# Patient Record
Sex: FEMALE | Race: WHITE | NOT HISPANIC OR LATINO | Employment: OTHER | URBAN - METROPOLITAN AREA
[De-identification: names, ages, dates, MRNs, and addresses within clinical notes are randomized per-mention and may not be internally consistent; named-entity substitution may affect disease eponyms.]

---

## 2021-12-22 DIAGNOSIS — R06.02 SHORTNESS OF BREATH: Primary | ICD-10-CM

## 2021-12-22 LAB — EKG IMPRESSION: NORMAL

## 2021-12-22 PROCEDURE — 99999 EKG: CPT | Performed by: INTERNAL MEDICINE

## 2022-04-20 ASSESSMENT — ENCOUNTER SYMPTOMS
WHEEZING: 1
RESPIRATORY SYMPTOMS COMMENTS: NOT ANYMORE
DYSPNEA AT REST: 0
SHORTNESS OF BREATH: 1

## 2022-04-21 ENCOUNTER — OFFICE VISIT (OUTPATIENT)
Dept: SLEEP MEDICINE | Facility: MEDICAL CENTER | Age: 47
End: 2022-04-21
Payer: MEDICAID

## 2022-04-21 VITALS
WEIGHT: 198 LBS | SYSTOLIC BLOOD PRESSURE: 124 MMHG | OXYGEN SATURATION: 92 % | HEART RATE: 119 BPM | BODY MASS INDEX: 37.41 KG/M2 | DIASTOLIC BLOOD PRESSURE: 66 MMHG

## 2022-04-21 DIAGNOSIS — R06.09 DYSPNEA ON EXERTION: ICD-10-CM

## 2022-04-21 DIAGNOSIS — R09.02 EXERCISE HYPOXEMIA: ICD-10-CM

## 2022-04-21 DIAGNOSIS — J45.40 MODERATE PERSISTENT ASTHMA WITHOUT COMPLICATION: ICD-10-CM

## 2022-04-21 PROCEDURE — 99204 OFFICE O/P NEW MOD 45 MIN: CPT | Mod: 25 | Performed by: STUDENT IN AN ORGANIZED HEALTH CARE EDUCATION/TRAINING PROGRAM

## 2022-04-21 PROCEDURE — 94761 N-INVAS EAR/PLS OXIMETRY MLT: CPT | Performed by: STUDENT IN AN ORGANIZED HEALTH CARE EDUCATION/TRAINING PROGRAM

## 2022-04-21 RX ORDER — OXCARBAZEPINE 150 MG/1
TABLET, FILM COATED ORAL DAILY
COMMUNITY

## 2022-04-21 RX ORDER — OXCARBAZEPINE 150 MG/1
300 TABLET, FILM COATED ORAL DAILY
COMMUNITY
Start: 2022-04-06

## 2022-04-21 RX ORDER — BUDESONIDE AND FORMOTEROL FUMARATE DIHYDRATE 160; 4.5 UG/1; UG/1
AEROSOL RESPIRATORY (INHALATION)
COMMUNITY
Start: 2021-12-01 | End: 2022-08-17

## 2022-04-21 RX ORDER — CLONAZEPAM 0.5 MG/1
0.5 TABLET ORAL PRN
COMMUNITY

## 2022-04-21 RX ORDER — ERGOCALCIFEROL 1.25 MG/1
50000 CAPSULE ORAL
COMMUNITY

## 2022-04-21 NOTE — PROCEDURES
Multi-Ox Readings  Multi Ox #1 Room air   O2 sat % at rest 93   O2 sat % on exertion 86   O2 sat average on exertion     Multi Ox #2 2 LPM   O2 sat % at rest 93   O2 sat % on exertion 92   O2 sat average on exertion       Oxygen Use     Oxygen Frequency     Duration of need     Is the patient mobile within the home?     CPAP Use?     BIPAP Use?     Servo Titration

## 2022-04-21 NOTE — PROGRESS NOTES
Pulmonary Clinic Note    Chief Complaint:  Chief Complaint   Patient presents with   • Establish Care     Referred by Dr Larson for Asthma     HPI:   Kristy Peters is a very pleasant 46 y.o. female with history of tobacco smoking 20+pkyr, quit 2021 who presents to pulmonary clinic for dyspnea.     Patient reports history of asthma and was initially referred to pulmonary for asthma management, however she now presents with ongoing dyspnea on exertion since her hospitalization for pneumonia in 2021. She was hospitalized at [facility] w/[facility] and was discharged home on O2 - since discharge, she is slowly improving her activity, now off O2. She can walk pretty far on level ground since her and her  go for routine walks around their property, however she becomes dyspneic when she exerts herself with activities like vacuuming and other resistance work. Denies any significant exposures, used to be around chickens that were owned by family members.       Past Medical History:   Diagnosis Date   • Asthma    • Back pain    • Chickenpox    • Daytime sleepiness    • Depression    • Diabetes (HCC)    • GERD (gastroesophageal reflux disease)    • Heartburn    • Insomnia    • Irregular periods    • Morning headache    • Nasal drainage    • Obesity    • Psychiatric disorder     bipolar   • Shortness of breath    • Wears glasses    • Wheezing        Past Surgical History:   Procedure Laterality Date   • LAPAROTOMY         Social History     Socioeconomic History   • Marital status: Single     Spouse name: Not on file   • Number of children: Not on file   • Years of education: Not on file   • Highest education level: Associate degree: occupational, technical, or vocational program   Occupational History   • Not on file   Tobacco Use   • Smoking status: Former Smoker     Packs/day: 1.00     Years: 20.00     Pack years: 20.00     Types: Cigarettes     Quit date: 2021     Years since quittin.3   • Smokeless  tobacco: Never Used   Vaping Use   • Vaping Use: Never used   Substance and Sexual Activity   • Alcohol use: No     Alcohol/week: 0.0 oz     Comment: Notes prior rare alcohol use, notes previously stopped use, denies current use   • Drug use: No     Comment: Notes prior occasional marijuana use, previously stopped use, denies current use   • Sexual activity: Not on file   Other Topics Concern   • Not on file   Social History Narrative   • Not on file     Social Determinants of Health     Financial Resource Strain: Medium Risk   • Difficulty of Paying Living Expenses: Somewhat hard   Food Insecurity: Food Insecurity Present   • Worried About Running Out of Food in the Last Year: Sometimes true   • Ran Out of Food in the Last Year: Never true   Transportation Needs: Unmet Transportation Needs   • Lack of Transportation (Medical): Yes   • Lack of Transportation (Non-Medical): Yes   Physical Activity: Insufficiently Active   • Days of Exercise per Week: 1 day   • Minutes of Exercise per Session: 20 min   Stress: No Stress Concern Present   • Feeling of Stress : Only a little   Social Connections: Socially Isolated   • Frequency of Communication with Friends and Family: Once a week   • Frequency of Social Gatherings with Friends and Family: Once a week   • Attends Congregation Services: Never   • Active Member of Clubs or Organizations: No   • Attends Club or Organization Meetings: Never   • Marital Status: Living with partner   Intimate Partner Violence: Not on file   Housing Stability: Low Risk    • Unable to Pay for Housing in the Last Year: No   • Number of Places Lived in the Last Year: 1   • Unstable Housing in the Last Year: No          Family History   Problem Relation Age of Onset   • Alzheimer's Disease Maternal Grandmother    • Asthma Father    • Hypertension Paternal Grandmother    • Respiratory Disease Maternal Grandfather    • Stroke Paternal Aunt        Current Outpatient Medications on File Prior to Visit    Medication Sig Dispense Refill   • budesonide-formoterol (SYMBICORT) 160-4.5 MCG/ACT Aerosol      • ergocalciferol (DRISDOL) 67007 UNIT capsule ergocalciferol (vitamin D2) 1,250 mcg (50,000 unit) capsule   TAKE 1 CAPSULE TWICE A WEEK     • OXcarbazepine (TRILEPTAL) 150 MG Tab Take 300 mg by mouth every day.     • OXcarbazepine (TRILEPTAL) 150 MG Tab Take  by mouth every day.     • clonazePAM (KLONOPIN) 0.5 MG Tab clonazepam 0.5 mg tablet   TAKE ONE (1) TABLET AS NEEDED     • PROAIR  (90 BASE) MCG/ACT Aero Soln inhalation aerosol      • albuterol (PROVENTIL) 2.5mg/3ml Nebu Soln solution for nebulization      • montelukast (SINGULAIR) 10 MG Tab Take 10 mg by mouth every day.     • divalproex ER (DEPAKOTE ER) 250 MG TABLET SR 24 HR      • nabumetone (RELAFEN) 500 MG Tab      • hydrocodone-acetaminophen (NORCO) 7.5-325 MG per tablet Take 1/2 to 1 tablet by mouth twice per day as needed for pain. 60 Tab 0   • hydrOXYzine (ATARAX) 25 MG Tab Take 1/2 to 1 tablet by mouth twice per day as needed for anxiety. 90 Tab 5   • oxcarbazepine (TRILEPTAL) 300 MG Tab Take 1 Tab by mouth 2 times a day. 60 Tab 3   • ferrous sulfate 325 (65 FE) MG tablet        No current facility-administered medications on file prior to visit.       Allergies: Bactrim [sulfamethoxazole w-trimethoprim] and Erythromycin    ROS:   Review of Systems   Constitutional: Negative for chills, fever, malaise/fatigue and weight loss.   HENT: Negative for congestion.    Respiratory: Positive for cough, shortness of breath and wheezing. Negative for hemoptysis and sputum production.    Cardiovascular: Positive for palpitations. Negative for chest pain.   Gastrointestinal: Negative for nausea and vomiting.   Neurological: Negative for focal weakness.       Vitals:  /66 (BP Location: Left arm, Patient Position: Sitting, BP Cuff Size: Adult)   Pulse (!) 119   Wt 89.8 kg (198 lb)   SpO2 92%     Physical Exam:  Physical Exam  Vitals and nursing note  reviewed.   Constitutional:       General: She is not in acute distress.     Appearance: Normal appearance. She is not ill-appearing or toxic-appearing.   HENT:      Head: Normocephalic and atraumatic.      Nose: Nose normal.      Mouth/Throat:      Mouth: Mucous membranes are moist.   Eyes:      General: No scleral icterus.     Conjunctiva/sclera: Conjunctivae normal.   Cardiovascular:      Rate and Rhythm: Normal rate and regular rhythm.   Pulmonary:      Effort: Pulmonary effort is normal. No respiratory distress.      Breath sounds: No wheezing, rhonchi or rales.   Abdominal:      Palpations: Abdomen is soft.   Musculoskeletal:         General: No deformity or signs of injury. Normal range of motion.      Cervical back: Normal range of motion.   Skin:     General: Skin is warm and dry.   Neurological:      General: No focal deficit present.      Mental Status: She is alert. Mental status is at baseline.   Psychiatric:         Mood and Affect: Mood normal.         Behavior: Behavior normal.         Data:  Multiox in clinic shows desaturation to 86% on RA - needs 2L w/exertion.     Assessment/Plan:    Problem List Items Addressed This Visit     Exercise hypoxemia    Dyspnea on exertion     Patient w/hx of asthma and this episode of pneumonia requiring supplemental O2 in 12/2021 - she's about 4 months after hospitalization but still hypoxemic w/ambulation. She does have a significantly elevated HR w/activity and intermittent palpitations so concern for potential arrhythmias? No CP.     - PFTs  - HRCT  - management of asthma as noted  - sleep referral for GLORIA symptoms  - 48hr holter moniter   - cardiology referral for f/u of palpitations and tachycardia   - continue supplemental O2 w/activity         Relevant Medications    Fluticasone Furoate-Vilanterol (BREO ELLIPTA) 100-25 MCG/INH AEROSOL POWDER, BREATH ACTIVATED    Other Relevant Orders    PULMONARY FUNCTION TESTS -Test requested: Complete Pulmonary Function  Test    CT-CHEST, HIGH RESOLUTION LUNG    Multiple Oximetry    DME O2 New Set Up    EC-ECHOCARDIOGRAM COMPLETE W/O CONT    REFERRAL TO CARDIOLOGY    Referral to Pulmonary and Sleep Medicine    Cardiac Event Monitor    Moderate persistent asthma without complication     - start Breo  - albuterol PRN         Relevant Medications    budesonide-formoterol (SYMBICORT) 160-4.5 MCG/ACT Aerosol    Fluticasone Furoate-Vilanterol (BREO ELLIPTA) 100-25 MCG/INH AEROSOL POWDER, BREATH ACTIVATED          Return in about 3 months (around 7/21/2022).       Time spent in record review prior to patient arrival, reviewing results, and in face-to-face encounter totaled 45 min, excluding any procedures if performed.    Deepti Treviño MD  Pulmonary and Critical Care Medicine  [facility]

## 2022-04-22 PROBLEM — J45.40 MODERATE PERSISTENT ASTHMA WITHOUT COMPLICATION: Status: ACTIVE | Noted: 2022-04-22

## 2022-04-22 PROBLEM — R09.02 EXERCISE HYPOXEMIA: Status: ACTIVE | Noted: 2022-04-22

## 2022-04-22 PROBLEM — R06.09 DYSPNEA ON EXERTION: Status: ACTIVE | Noted: 2022-04-22

## 2022-04-22 ASSESSMENT — ENCOUNTER SYMPTOMS
CHILLS: 0
FOCAL WEAKNESS: 0
SHORTNESS OF BREATH: 1
WHEEZING: 1
SPUTUM PRODUCTION: 0
FEVER: 0
WEIGHT LOSS: 0
COUGH: 1
VOMITING: 0
HEMOPTYSIS: 0
PALPITATIONS: 1
NAUSEA: 0

## 2022-04-23 NOTE — ASSESSMENT & PLAN NOTE
Patient w/hx of asthma and this episode of pneumonia requiring supplemental O2 in 12/2021 - she's about 4 months after hospitalization but still hypoxemic w/ambulation. She does have a significantly elevated HR w/activity and intermittent palpitations so concern for potential arrhythmias? No CP.     - PFTs  - HRCT  - management of asthma as noted  - sleep referral for GLORIA symptoms  - 48hr holter moniter   - cardiology referral for f/u of palpitations and tachycardia   - continue supplemental O2 w/activity

## 2022-05-05 ENCOUNTER — PATIENT MESSAGE (OUTPATIENT)
Dept: SLEEP MEDICINE | Facility: MEDICAL CENTER | Age: 47
End: 2022-05-05
Payer: MEDICAID

## 2022-05-06 ENCOUNTER — PATIENT MESSAGE (OUTPATIENT)
Dept: SLEEP MEDICINE | Facility: MEDICAL CENTER | Age: 47
End: 2022-05-06
Payer: MEDICAID

## 2022-05-11 DIAGNOSIS — R06.09 DYSPNEA ON EXERTION: ICD-10-CM

## 2022-05-12 DIAGNOSIS — R06.09 DYSPNEA ON EXERTION: ICD-10-CM

## 2022-05-18 ENCOUNTER — NON-PROVIDER VISIT (OUTPATIENT)
Dept: CARDIOLOGY | Facility: MEDICAL CENTER | Age: 47
End: 2022-05-18
Payer: MEDICAID

## 2022-05-18 DIAGNOSIS — R06.09 DYSPNEA ON EXERTION: ICD-10-CM

## 2022-05-18 DIAGNOSIS — I49.3 PVCS (PREMATURE VENTRICULAR CONTRACTIONS): ICD-10-CM

## 2022-05-18 DIAGNOSIS — I49.1 PREMATURE ATRIAL CONTRACTIONS: ICD-10-CM

## 2022-05-18 NOTE — PROGRESS NOTES
Home enrollment completed for the 30 day [employer] [facility] monitoring program per Deepti Treviño MD.  >Monitor to be shipped to patient by [employer].  >Baseline on 5/25/22.  >EOS pending physician review as of 6/24/22..

## 2022-05-23 ENCOUNTER — PATIENT MESSAGE (OUTPATIENT)
Dept: SLEEP MEDICINE | Facility: MEDICAL CENTER | Age: 47
End: 2022-05-23
Payer: MEDICAID

## 2022-05-24 ENCOUNTER — PATIENT MESSAGE (OUTPATIENT)
Dept: SLEEP MEDICINE | Facility: MEDICAL CENTER | Age: 47
End: 2022-05-24
Payer: MEDICAID

## 2022-06-07 ENCOUNTER — TELEPHONE (OUTPATIENT)
Dept: CARDIOLOGY | Facility: MEDICAL CENTER | Age: 47
End: 2022-06-07
Payer: MEDICAID

## 2022-06-07 DIAGNOSIS — R06.09 DYSPNEA ON EXERTION: ICD-10-CM

## 2022-06-07 NOTE — TELEPHONE ENCOUNTER
Called patient to request records for NP telemed appointment with . Called to confirm if this will be first time patient is seeing a cardiologist and if the patient has had any recent cardiac imaging, testing, or lab work done outside of [facility]. No answer, left voicemail to call back.

## 2022-06-07 NOTE — TELEPHONE ENCOUNTER
Marisabel Agudelo, Med Ass't  Adonay Avendaño, RTinNTin 47 minutes ago (10:21 AM)     FATOUMATA Urias, could you order an EKG for patient's upcoming telemed appt with  on 6/14/22? Thank you!    Routing comment

## 2022-06-20 ENCOUNTER — PATIENT MESSAGE (OUTPATIENT)
Dept: SLEEP MEDICINE | Facility: MEDICAL CENTER | Age: 47
End: 2022-06-20
Payer: MEDICAID

## 2022-06-21 ENCOUNTER — PATIENT MESSAGE (OUTPATIENT)
Dept: SLEEP MEDICINE | Facility: MEDICAL CENTER | Age: 47
End: 2022-06-21
Payer: MEDICAID

## 2022-06-24 ENCOUNTER — TELEPHONE (OUTPATIENT)
Dept: CARDIOLOGY | Facility: MEDICAL CENTER | Age: 47
End: 2022-06-24
Payer: MEDICAID

## 2022-06-24 DIAGNOSIS — R06.09 DYSPNEA ON EXERTION: ICD-10-CM

## 2022-07-12 PROCEDURE — 93268 ECG RECORD/REVIEW: CPT | Performed by: INTERNAL MEDICINE

## 2022-07-13 ENCOUNTER — PATIENT MESSAGE (OUTPATIENT)
Dept: SLEEP MEDICINE | Facility: MEDICAL CENTER | Age: 47
End: 2022-07-13
Payer: MEDICAID

## 2022-07-14 ENCOUNTER — APPOINTMENT (OUTPATIENT)
Dept: SLEEP MEDICINE | Facility: MEDICAL CENTER | Age: 47
End: 2022-07-14
Payer: MEDICAID

## 2022-08-03 DIAGNOSIS — R06.09 DYSPNEA ON EXERTION: ICD-10-CM

## 2022-08-17 ENCOUNTER — TELEPHONE (OUTPATIENT)
Dept: CARDIOLOGY | Facility: MEDICAL CENTER | Age: 47
End: 2022-08-17

## 2022-08-17 ENCOUNTER — TELEMEDICINE2 (OUTPATIENT)
Dept: CARDIOLOGY | Facility: MEDICAL CENTER | Age: 47
End: 2022-08-17
Payer: MEDICAID

## 2022-08-17 VITALS
HEART RATE: 129 BPM | TEMPERATURE: 96.6 F | DIASTOLIC BLOOD PRESSURE: 82 MMHG | SYSTOLIC BLOOD PRESSURE: 134 MMHG | RESPIRATION RATE: 12 BRPM | BODY MASS INDEX: 37.99 KG/M2 | HEIGHT: 61 IN | WEIGHT: 201.2 LBS | OXYGEN SATURATION: 92 %

## 2022-08-17 DIAGNOSIS — R09.02 HYPOXIA: ICD-10-CM

## 2022-08-17 DIAGNOSIS — R06.09 DYSPNEA ON EXERTION: ICD-10-CM

## 2022-08-17 PROCEDURE — 99244 OFF/OP CNSLTJ NEW/EST MOD 40: CPT | Performed by: INTERNAL MEDICINE

## 2022-08-17 NOTE — TELEPHONE ENCOUNTER
EC-ECHOCARDIOGRAM COMPLETE W/O CONT and lab orders ordered by  faxed over to original site of Telemed to get scheduled.

## 2022-08-17 NOTE — LETTER
[facility]   [address], [address]  [place] [address]  Phone: [phone]  Fax: [phone]              Kristy Peters  1975    Encounter Date: 8/17/2022    Angel Denson M.D.          PROGRESS NOTE:  Chief Complaint   Patient presents with   • Other     N/P Dx: Dyspnea on exertion         Subjective     Kristy Peters is a 47 y.o. female who presents today  in consultation from Mariah Mcdaniels M.D. and James Treviño for evaluation of dyspnea on exertion and hypoxia with tachycardia by home testing    Had pneumonai syndrome in December COVID negative, since then she is noted tachycardia and relative hypoxia on home pulse oximetry testing and decreased exercise tolerance      Past Medical History:   Diagnosis Date   • Asthma    • Back pain    • Chickenpox    • Daytime sleepiness    • Depression    • Diabetes (HCC)    • GERD (gastroesophageal reflux disease)    • Heartburn    • Insomnia    • Irregular periods    • Morning headache    • Nasal drainage    • Obesity    • Psychiatric disorder     bipolar   • Shortness of breath    • Wears glasses    • Wheezing      Past Surgical History:   Procedure Laterality Date   • LAPAROTOMY       Family History   Problem Relation Age of Onset   • Asthma Father    • Stroke Paternal Aunt    • Alzheimer's Disease Maternal Grandmother    • Respiratory Disease Maternal Grandfather    • Heart Failure Paternal Grandmother    • Hypertension Paternal Grandmother      Social History     Socioeconomic History   • Marital status: Single     Spouse name: Not on file   • Number of children: Not on file   • Years of education: Not on file   • Highest education level: Associate degree: occupational, technical, or vocational program   Occupational History   • Not on file   Tobacco Use   • Smoking status: Former     Packs/day: 1.00     Years: 20.00     Pack years: 20.00     Types: Cigarettes     Quit date: 12/8/2021      Years since quittin.6   • Smokeless tobacco: Never   Vaping Use   • Vaping Use: Never used   Substance and Sexual Activity   • Alcohol use: No     Alcohol/week: 0.0 oz     Comment: Notes prior rare alcohol use, notes previously stopped use, denies current use   • Drug use: No     Comment: Notes prior occasional marijuana use, previously stopped use, denies current use   • Sexual activity: Not on file   Other Topics Concern   • Not on file   Social History Narrative   • Not on file     Social Determinants of Health     Financial Resource Strain: Medium Risk   • Difficulty of Paying Living Expenses: Somewhat hard   Food Insecurity: Food Insecurity Present   • Worried About Running Out of Food in the Last Year: Sometimes true   • Ran Out of Food in the Last Year: Never true   Transportation Needs: Unmet Transportation Needs   • Lack of Transportation (Medical): Yes   • Lack of Transportation (Non-Medical): Yes   Physical Activity: Insufficiently Active   • Days of Exercise per Week: 1 day   • Minutes of Exercise per Session: 20 min   Stress: No Stress Concern Present   • Feeling of Stress : Only a little   Social Connections: Socially Isolated   • Frequency of Communication with Friends and Family: Once a week   • Frequency of Social Gatherings with Friends and Family: Once a week   • Attends Jewish Services: Never   • Active Member of Clubs or Organizations: No   • Attends Club or Organization Meetings: Never   • Marital Status: Living with partner   Intimate Partner Violence: Not on file   Housing Stability: Low Risk    • Unable to Pay for Housing in the Last Year: No   • Number of Places Lived in the Last Year: 1   • Unstable Housing in the Last Year: No     Allergies   Allergen Reactions   • Bactrim [Sulfamethoxazole W-Trimethoprim] Rash     All over body   • Erythromycin Vomiting     Outpatient Encounter Medications as of 2022   Medication Sig Dispense Refill   • metFORMIN (GLUCOPHAGE) 500 MG Tab Take  "500 mg by mouth 2 times a day with meals.     • ergocalciferol (DRISDOL) 18344 UNIT capsule Take 50,000 Units by mouth. Twice a week     • OXcarbazepine (TRILEPTAL) 150 MG Tab Take 300 mg by mouth every day.     • OXcarbazepine (TRILEPTAL) 150 MG Tab Take  by mouth every day.     • clonazePAM (KLONOPIN) 0.5 MG Tab Take 0.5 mg by mouth as needed.     • Fluticasone Furoate-Vilanterol (BREO ELLIPTA) 100-25 MCG/INH AEROSOL POWDER, BREATH ACTIVATED Inhale 1 Puff every day. Rinse mouth after use. 1 Each 3   • PROAIR  (90 BASE) MCG/ACT Aero Soln inhalation aerosol Inhale 1-2 Puffs as needed.     • albuterol (PROVENTIL) 2.5mg/3ml Nebu Soln solution for nebulization every four hours as needed.     • montelukast (SINGULAIR) 10 MG Tab Take 10 mg by mouth every day.     • [DISCONTINUED] budesonide-formoterol (SYMBICORT) 160-4.5 MCG/ACT Aerosol        No facility-administered encounter medications on file as of 8/17/2022.     ROS           Objective     /82 (BP Location: Left arm, Patient Position: Sitting, BP Cuff Size: Adult)   Pulse (!) 129   Temp 35.9 °C (96.6 °F) (Temporal)   Resp 12   Ht 1.549 m (5' 1\")   Wt 91.3 kg (201 lb 3.2 oz)   SpO2 92%   BMI 38.02 kg/m²     Physical Exam       Vital signs are reported by the patient    General appears well  Eyes no icterus  Extremities no edema  Skin no apparent rashes    This evaluation was conducted via [employer] using secure and encrypted videoconferencing technology. The patient was physically located at [facility] in [place], [place]. The patient was presented by a medical professional at the originating site.   The patient's identity was confirmed and verbal consent was obtained for this telemedicine encounter..    Ecg and event monitor reviewed    Prior labs in 2015 showed iron def anemia    Assessment & Plan     1. Hypoxia  EC-ECHOCARDIOGRAM COMPLETE W/O CONT    CBC WITHOUT DIFFERENTIAL    IRON/TOTAL IRON BIND    FERRITIN      2. Dyspnea on " exertion  EC-ECHOCARDIOGRAM COMPLETE W/O CONT    CBC WITHOUT DIFFERENTIAL    IRON/TOTAL IRON BIND    FERRITIN          Medical Decision Making: Today's Assessment/Status/Plan:        It was my pleasure to meet with Ms. Peters.    Blood pressure is well controlled.  She will continue to monitor and eat hearty healthy diet.    Check echo with bubble study to rule our rt to left shunt    Check anemia studies if not recently checked    We will follow up with Ms. Peters on the results of the testing over the phone. We will determine further follow-up from there.    It is my pleasure to participate in the care of Ms. Peters.  Please do not hesitate to contact me with questions or concerns.    Angel Denson MD PhD FAC  Cardiologist [facility]    Please note that this dictation was created using voice recognition software. There may be errors I did not discover before finalizing the note.     8/17/2022  4:09 PM                          No Recipients

## 2022-08-17 NOTE — PROGRESS NOTES
Chief Complaint   Patient presents with    Other     N/P Dx: Dyspnea on exertion         Subjective     Kristy Peters is a 47 y.o. female who presents today  in consultation from Mariah Mcdaniels M.D. and James Treviño for evaluation of dyspnea on exertion and hypoxia with tachycardia by home testing    Had pneumonai syndrome in December COVID negative, since then she is noted tachycardia and relative hypoxia on home pulse oximetry testing and decreased exercise tolerance      Past Medical History:   Diagnosis Date    Asthma     Back pain     Chickenpox     Daytime sleepiness     Depression     Diabetes (HCC)     GERD (gastroesophageal reflux disease)     Heartburn     Insomnia     Irregular periods     Morning headache     Nasal drainage     Obesity     Psychiatric disorder     bipolar    Shortness of breath     Wears glasses     Wheezing      Past Surgical History:   Procedure Laterality Date    LAPAROTOMY       Family History   Problem Relation Age of Onset    Asthma Father     Stroke Paternal Aunt     Alzheimer's Disease Maternal Grandmother     Respiratory Disease Maternal Grandfather     Heart Failure Paternal Grandmother     Hypertension Paternal Grandmother      Social History     Socioeconomic History    Marital status: Single     Spouse name: Not on file    Number of children: Not on file    Years of education: Not on file    Highest education level: Associate degree: occupational, technical, or vocational program   Occupational History    Not on file   Tobacco Use    Smoking status: Former     Packs/day: 1.00     Years: 20.00     Pack years: 20.00     Types: Cigarettes     Quit date: 2021     Years since quittin.6    Smokeless tobacco: Never   Vaping Use    Vaping Use: Never used   Substance and Sexual Activity    Alcohol use: No     Alcohol/week: 0.0 oz     Comment: Notes prior rare alcohol use, notes previously stopped use, denies current use    Drug use: No     Comment: Notes  prior occasional marijuana use, previously stopped use, denies current use    Sexual activity: Not on file   Other Topics Concern    Not on file   Social History Narrative    Not on file     Social Determinants of Health     Financial Resource Strain: Medium Risk    Difficulty of Paying Living Expenses: Somewhat hard   Food Insecurity: Food Insecurity Present    Worried About Running Out of Food in the Last Year: Sometimes true    Ran Out of Food in the Last Year: Never true   Transportation Needs: Unmet Transportation Needs    Lack of Transportation (Medical): Yes    Lack of Transportation (Non-Medical): Yes   Physical Activity: Insufficiently Active    Days of Exercise per Week: 1 day    Minutes of Exercise per Session: 20 min   Stress: No Stress Concern Present    Feeling of Stress : Only a little   Social Connections: Socially Isolated    Frequency of Communication with Friends and Family: Once a week    Frequency of Social Gatherings with Friends and Family: Once a week    Attends [employer] Services: Never    Active Member of Clubs or Organizations: No    Attends Club or Organization Meetings: Never    Marital Status: Living with partner   Intimate Partner Violence: Not on file   Housing Stability: Low Risk     Unable to Pay for Housing in the Last Year: No    Number of Places Lived in the Last Year: 1    Unstable Housing in the Last Year: No     Allergies   Allergen Reactions    Bactrim [Sulfamethoxazole W-Trimethoprim] Rash     All over body    Erythromycin Vomiting     Outpatient Encounter Medications as of 8/17/2022   Medication Sig Dispense Refill    metFORMIN (GLUCOPHAGE) 500 MG Tab Take 500 mg by mouth 2 times a day with meals.      ergocalciferol (DRISDOL) 64303 UNIT capsule Take 50,000 Units by mouth. Twice a week      OXcarbazepine (TRILEPTAL) 150 MG Tab Take 300 mg by mouth every day.      OXcarbazepine (TRILEPTAL) 150 MG Tab Take  by mouth every day.      clonazePAM (KLONOPIN) 0.5 MG Tab Take 0.5 mg  "by mouth as needed.      Fluticasone Furoate-Vilanterol (BREO ELLIPTA) 100-25 MCG/INH AEROSOL POWDER, BREATH ACTIVATED Inhale 1 Puff every day. Rinse mouth after use. 1 Each 3    PROAIR  (90 BASE) MCG/ACT Aero Soln inhalation aerosol Inhale 1-2 Puffs as needed.      albuterol (PROVENTIL) 2.5mg/3ml Nebu Soln solution for nebulization every four hours as needed.      montelukast (SINGULAIR) 10 MG Tab Take 10 mg by mouth every day.      [DISCONTINUED] budesonide-formoterol (SYMBICORT) 160-4.5 MCG/ACT Aerosol        No facility-administered encounter medications on file as of 8/17/2022.     ROS           Objective     /82 (BP Location: Left arm, Patient Position: Sitting, BP Cuff Size: Adult)   Pulse (!) 129   Temp 35.9 °C (96.6 °F) (Temporal)   Resp 12   Ht 1.549 m (5' 1\")   Wt 91.3 kg (201 lb 3.2 oz)   SpO2 92%   BMI 38.02 kg/m²     Physical Exam       Vital signs are reported by the patient    General appears well  Eyes no icterus  Extremities no edema  Skin no apparent rashes    This evaluation was conducted via [employer] using secure and encrypted videoconferencing technology. The patient was physically located at [facility] in [place], [place]. The patient was presented by a medical professional at the originating site.   The patient's identity was confirmed and verbal consent was obtained for this telemedicine encounter..    Ecg and event monitor reviewed    Prior labs in 2015 showed iron def anemia    Assessment & Plan     1. Hypoxia  EC-ECHOCARDIOGRAM COMPLETE W/O CONT    CBC WITHOUT DIFFERENTIAL    IRON/TOTAL IRON BIND    FERRITIN      2. Dyspnea on exertion  EC-ECHOCARDIOGRAM COMPLETE W/O CONT    CBC WITHOUT DIFFERENTIAL    IRON/TOTAL IRON BIND    FERRITIN          Medical Decision Making: Today's Assessment/Status/Plan:        It was my pleasure to meet with Ms. Peters.    Blood pressure is well controlled.  She will continue to monitor and eat hearty healthy diet.    Check echo " with bubble study to rule our rt to left shunt    Check anemia studies if not recently checked    We will follow up with Ms. Peters on the results of the testing over the phone. We will determine further follow-up from there.    It is my pleasure to participate in the care of Ms. Peters.  Please do not hesitate to contact me with questions or concerns.    Angel Denson MD PhD [facility]  Cardiologist [facility]    Please note that this dictation was created using voice recognition software. There may be errors I did not discover before finalizing the note.     8/17/2022  4:09 PM

## 2022-09-13 DIAGNOSIS — R06.09 DYSPNEA ON EXERTION: ICD-10-CM

## 2022-09-14 NOTE — TELEPHONE ENCOUNTER
LVM for the pt informing her that we received a refill request for BREO ELLIPTA 100-25 MCG/INH AEROSOL POWDER, BREATH ACTIVATED and it was approve and sent to [facility] - [place], [place] - [address]

## 2022-09-19 ENCOUNTER — APPOINTMENT (OUTPATIENT)
Dept: SLEEP MEDICINE | Facility: MEDICAL CENTER | Age: 47
End: 2022-09-19
Payer: MEDICAID

## 2022-10-24 ENCOUNTER — PATIENT MESSAGE (OUTPATIENT)
Dept: CARDIOLOGY | Facility: MEDICAL CENTER | Age: 47
End: 2022-10-24
Payer: MEDICAID

## 2022-10-24 DIAGNOSIS — Q21.12 PFO (PATENT FORAMEN OVALE): Chronic | ICD-10-CM

## 2022-11-04 PROBLEM — Q21.12 PFO (PATENT FORAMEN OVALE): Chronic | Status: ACTIVE | Noted: 2022-11-04

## 2022-11-04 NOTE — TELEPHONE ENCOUNTER
To CW: Please review records for recent echo with bubble study in Media tab and advise patient on next steps.

## 2022-11-04 NOTE — PROGRESS NOTES
I called the patient and let her know that the echo suggests PFO.    She should see Dr Montaño for evaluaiton in person, please reach out to [facility] to get the images of her echo to review      I also advised her to get the labs I ordered, please make sure the lab slip is faxed to [facility] or mailed to the patient    It is my pleasure to participate in the care of Ms. Peters.  Please do not hesitate to contact me with questions or concerns.    Angel Denson MD PhD [facility]  Cardiologist [facility] Heart and Vascular Health    Please note that this dictation was created using voice recognition software. There may be errors I did not discover before finalizing the note.     11/4/2022  10:36 AM

## 2022-11-07 ENCOUNTER — TELEPHONE (OUTPATIENT)
Dept: CARDIOLOGY | Facility: MEDICAL CENTER | Age: 47
End: 2022-11-07
Payer: MEDICAID

## 2022-11-07 NOTE — TELEPHONE ENCOUNTER
Referral from: Dr. Denson for PFO consult    Patient called on 11/7/2022. Scheduled consult with Dr. Montaño first available for patient's schedule.

## 2022-11-07 NOTE — PATIENT COMMUNICATION
Thanks for the referral Dr. Denson, we will get her in with Dr. Montaño.    I've requested echo images and faxed lab orders to [facility] as well as mailed them to the patient.

## 2022-11-07 NOTE — TELEPHONE ENCOUNTER
Echo images from 10/20/2022 requested from [facility] [phone].    Lab orders faxed to [facility] [phone] and mailed to patient.

## 2022-11-17 ENCOUNTER — HOSPITAL ENCOUNTER (OUTPATIENT)
Dept: RADIOLOGY | Facility: MEDICAL CENTER | Age: 47
End: 2022-11-17
Payer: MEDICAID

## 2022-11-23 ENCOUNTER — TELEPHONE (OUTPATIENT)
Dept: CARDIOLOGY | Facility: MEDICAL CENTER | Age: 47
End: 2022-11-23

## 2022-11-23 ENCOUNTER — OFFICE VISIT (OUTPATIENT)
Dept: CARDIOLOGY | Facility: MEDICAL CENTER | Age: 47
End: 2022-11-23
Payer: MEDICAID

## 2022-11-23 VITALS
SYSTOLIC BLOOD PRESSURE: 130 MMHG | RESPIRATION RATE: 18 BRPM | DIASTOLIC BLOOD PRESSURE: 80 MMHG | WEIGHT: 203 LBS | HEART RATE: 120 BPM | BODY MASS INDEX: 38.33 KG/M2 | OXYGEN SATURATION: 93 % | HEIGHT: 61 IN

## 2022-11-23 DIAGNOSIS — Q21.12 PFO (PATENT FORAMEN OVALE): ICD-10-CM

## 2022-11-23 LAB — EKG IMPRESSION: NORMAL

## 2022-11-23 PROCEDURE — 93000 ELECTROCARDIOGRAM COMPLETE: CPT | Performed by: INTERNAL MEDICINE

## 2022-11-23 PROCEDURE — 99204 OFFICE O/P NEW MOD 45 MIN: CPT | Performed by: INTERNAL MEDICINE

## 2022-11-23 RX ORDER — BLOOD SUGAR DIAGNOSTIC
STRIP MISCELLANEOUS
COMMUNITY
Start: 2022-11-07

## 2022-11-23 NOTE — PROGRESS NOTES
Structural heart initial Consultation Note    Date of note:    11/23/2022      Patient Name: Kristy Peters   YOB: 1975  MRN:              [id number]    Chief Complaint: Patent foramen ovale, tachycardia    Kristy Peters is a 47 y.o. female  patient presented today for evaluation of tachycardia, shortness of breath with exertion with decreased oxygen saturation.  She was a smoker but quit a year ago.  She has diabetes, migraines, echocardiogram showed patent foramen ovale.  Here for consultation regarding possible PFO closure causing her hypoxemia.  She also had a heart monitor showed significant sinus tachycardia.        Past Medical History:   Diagnosis Date    Asthma     Back pain     Chickenpox     Daytime sleepiness     Depression     Diabetes (HCC)     GERD (gastroesophageal reflux disease)     Heartburn     Insomnia     Irregular periods     Morning headache     Nasal drainage     Obesity     PFO (patent foramen ovale) - based on agitated saline study 11/4/2022    Psychiatric disorder     bipolar    Shortness of breath     Wears glasses     Wheezing              Current Outpatient Medications   Medication Sig Dispense Refill    glucose blood strip OneTouch Verio test strips   CHECK 3 TIMES A DAY ON 2 DAYS OF THE WEEK.      ONETOUCH VERIO strip       VITAMIN D PO Take 50,000 Units by mouth. 2x a week      metoprolol tartrate (LOPRESSOR) 25 MG Tab Take 1 Tablet by mouth 2 times a day. 60 Tablet 11    BREO ELLIPTA 100-25 MCG/INH AEROSOL POWDER, BREATH ACTIVATED INHALE 1 PUFF EVERY DAY. RINSE MOUTH AFTER USE. 60 Each 2    metFORMIN (GLUCOPHAGE) 500 MG Tab Take 500 mg by mouth 2 times a day with meals.      ergocalciferol (DRISDOL) 18999 UNIT capsule Take 50,000 Units by mouth. Twice a week      OXcarbazepine (TRILEPTAL) 150 MG Tab Take 300 mg by mouth every day.      OXcarbazepine (TRILEPTAL) 150 MG Tab Take  by mouth every day.      clonazePAM (KLONOPIN) 0.5 MG Tab Take 0.5 mg by mouth  "as needed.      PROAIR  (90 BASE) MCG/ACT Aero Soln inhalation aerosol Inhale 1-2 Puffs as needed.      albuterol (PROVENTIL) 2.5mg/3ml Nebu Soln solution for nebulization every four hours as needed.      montelukast (SINGULAIR) 10 MG Tab Take 10 mg by mouth every day.       No current facility-administered medications for this visit.             Physical Exam:  Ambulatory Vitals  /80 (BP Location: Left arm, Patient Position: Sitting, BP Cuff Size: Adult)   Pulse (!) 120   Resp 18   Ht 1.549 m (5' 1\")   Wt 92.1 kg (203 lb)   SpO2 93%    Oxygen Therapy:  Pulse Oximetry: 93 %  BP Readings from Last 4 Encounters:   11/23/22 130/80   08/17/22 134/82   04/21/22 124/66   11/03/16 118/78       Weight/BMI: Body mass index is 38.36 kg/m².  Wt Readings from Last 4 Encounters:   11/23/22 92.1 kg (203 lb)   08/17/22 91.3 kg (201 lb 3.2 oz)   04/21/22 89.8 kg (198 lb)   11/03/16 83.5 kg (184 lb)           General: Well appearing and in no apparent distress  Neck: carotid bruits absent  Lungs: respiratory sounds  normal  Heart: Regular rhythm,  No palpable thrills on palpation, murmurs absent, no rubs,   Lowe extremity edema absent.     CT chest 5/10/2022 was unremarkable.    Dr. Denson notes, echocardiogram reports reviewed.  Heart monitor reviewed, independently interpreted    Medical Decision Making:  Problem List Items Addressed This Visit       PFO (patent foramen ovale) - based on agitated saline study (Chronic)    Relevant Medications    metoprolol tartrate (LOPRESSOR) 25 MG Tab     Walking test today shows her heart rate goes up to 140 with minimum exertion, oxygen saturation dropped to 86% from 92 at baseline.  I will get echo images, reviewed.  Start her on metoprolol 25 mg twice daily to see controlling tachycardia improves her hypoxemia resolved.  Dr. Denson ordered labs which was done couple days ago, will review them.    I will see her back in a month with a video visit, we will follow-up on the " symptoms, she lives in [place], we will bring her back for MAGALIE with possible PFO closure at the same time if needed.        This note was dictated using Dragon speech recognition software.    Luis Enrique DAVID  Interventional cardiologist  [facility] Heart and Vascular [facility], [facility]  [address] [address]  Phone: [phone]  Fax: [phone]

## 2022-11-24 NOTE — TELEPHONE ENCOUNTER
Faxed request for labs & recent echo report & disc to [facility]. Conformation received and scan into [facility].

## 2022-11-29 ENCOUNTER — PATIENT MESSAGE (OUTPATIENT)
Dept: CARDIOLOGY | Facility: MEDICAL CENTER | Age: 47
End: 2022-11-29
Payer: MEDICAID

## 2022-12-01 DIAGNOSIS — R09.02 HYPOXIA: ICD-10-CM

## 2022-12-01 DIAGNOSIS — R06.09 DYSPNEA ON EXERTION: ICD-10-CM

## 2022-12-07 ENCOUNTER — PATIENT MESSAGE (OUTPATIENT)
Dept: CARDIOLOGY | Facility: MEDICAL CENTER | Age: 47
End: 2022-12-07
Payer: MEDICAID

## 2022-12-07 DIAGNOSIS — I49.1 PREMATURE ATRIAL CONTRACTIONS: ICD-10-CM

## 2022-12-07 DIAGNOSIS — I49.3 PVCS (PREMATURE VENTRICULAR CONTRACTIONS): ICD-10-CM

## 2022-12-08 NOTE — PATIENT COMMUNICATION
Luis Enrique Montaño M.D.  You 17 hours ago (3:03 PM)     Ok, stop metoprolol and have her try diltiazem 30 mg in am, and see if that helps.   Send a prescription for diltiazem please

## 2022-12-19 ENCOUNTER — PATIENT MESSAGE (OUTPATIENT)
Dept: CARDIOLOGY | Facility: MEDICAL CENTER | Age: 47
End: 2022-12-19
Payer: MEDICAID

## 2022-12-19 DIAGNOSIS — R00.0 TACHYCARDIA: ICD-10-CM

## 2022-12-20 RX ORDER — PROPRANOLOL HYDROCHLORIDE 10 MG/1
30 TABLET ORAL 2 TIMES DAILY
Qty: 540 TABLET | Refills: 3 | Status: SHIPPED | OUTPATIENT
Start: 2022-12-20 | End: 2023-01-11

## 2022-12-20 NOTE — PROGRESS NOTES
Can stop diltiazem and trial propanolol 30 mg BID for tachycardia. Already had intolerance to metoprolol in the past. SC

## 2023-01-11 ENCOUNTER — TELEMEDICINE (OUTPATIENT)
Dept: CARDIOLOGY | Facility: MEDICAL CENTER | Age: 48
End: 2023-01-11
Payer: MEDICAID

## 2023-01-11 VITALS
SYSTOLIC BLOOD PRESSURE: 120 MMHG | WEIGHT: 200 LBS | DIASTOLIC BLOOD PRESSURE: 78 MMHG | HEART RATE: 130 BPM | HEIGHT: 61 IN | OXYGEN SATURATION: 91 % | BODY MASS INDEX: 37.76 KG/M2

## 2023-01-11 DIAGNOSIS — Q21.12 PFO (PATENT FORAMEN OVALE): Chronic | ICD-10-CM

## 2023-01-11 DIAGNOSIS — R00.0 SINUS TACHYCARDIA: ICD-10-CM

## 2023-01-11 PROCEDURE — 99214 OFFICE O/P EST MOD 30 MIN: CPT | Performed by: INTERNAL MEDICINE

## 2023-01-11 NOTE — PROGRESS NOTES
Structural heart follow-up consultation Note virtual visit   visit was done through encrypted Zoom conferencing software.  Patient is a resident of [place], located at her home.  Patient identity was verified, verbal consent was obtained.          Chief Complaint: Patent foramen ovale, tachycardia    Kristy Peters is a 47 y.o. female here for follow-up.  She has unexplained sinus tachycardia, exertional hypoxemia, PFO with right-to-left shunt.  During my last evaluation she was given metoprolol, diltiazem, propranolol could not tolerate any of them.  Patient feels about the same, may be slightly better.      Past Medical History:   Diagnosis Date    Asthma     Back pain     Chickenpox     Daytime sleepiness     Depression     Diabetes (HCC)     GERD (gastroesophageal reflux disease)     Heartburn     Insomnia     Irregular periods     Morning headache     Nasal drainage     Obesity     PFO (patent foramen ovale) - based on agitated saline study 11/4/2022    Psychiatric disorder     bipolar    Shortness of breath     Wears glasses     Wheezing              Current Outpatient Medications   Medication Sig Dispense Refill    ivabradine (CORLANOR) 5 MG Tab tablet Take 1 Tablet by mouth 2 times a day with meals. 60 Tablet 11    fluticasone furoate-vilanterol (BREO ELLIPTA) 100-25 MCG/ACT AEROSOL POWDER, BREATH ACTIVATED Inhale 1 Puff every day. Pt needs a follow up appointment for further refills 1 Each 0    metFORMIN (GLUCOPHAGE) 500 MG Tab Take 500 mg by mouth 2 times a day with meals.      ergocalciferol (DRISDOL) 65547 UNIT capsule Take 50,000 Units by mouth. Twice a week      OXcarbazepine (TRILEPTAL) 150 MG Tab Take 300 mg by mouth every day.      OXcarbazepine (TRILEPTAL) 150 MG Tab Take  by mouth every day.      clonazePAM (KLONOPIN) 0.5 MG Tab Take 0.5 mg by mouth as needed.      PROAIR  (90 BASE) MCG/ACT Aero Soln inhalation aerosol Inhale 1-2 Puffs as needed.      albuterol (PROVENTIL) 2.5mg/3ml  "Nebu Soln solution for nebulization every four hours as needed.      montelukast (SINGULAIR) 10 MG Tab Take 10 mg by mouth every day.      glucose blood strip OneTouch Verio test strips   CHECK 3 TIMES A DAY ON 2 DAYS OF THE WEEK.      ONETOUCH VERIO strip        No current facility-administered medications for this visit.             Physical Exam:  Ambulatory Vitals  /78 (BP Location: Left arm, Patient Position: Sitting, BP Cuff Size: Adult)   Pulse (!) 130   Ht 1.549 m (5' 1\")   Wt 90.7 kg (200 lb)   SpO2 91%    Oxygen Therapy:  Pulse Oximetry: 91 %  BP Readings from Last 4 Encounters:   01/11/23 120/78   11/23/22 130/80   08/17/22 134/82   04/21/22 124/66       Weight/BMI: Body mass index is 37.79 kg/m².  Wt Readings from Last 4 Encounters:   01/11/23 90.7 kg (200 lb)   11/23/22 92.1 kg (203 lb)   08/17/22 91.3 kg (201 lb 3.2 oz)   04/21/22 89.8 kg (198 lb)           General: Well appearing and in no apparent distress  Neck: carotid bruits absent  Lungs: respiratory sounds  normal  Heart: Regular rhythm,  No palpable thrills on palpation, murmurs absent, no rubs,   Lowe extremity edema absent.     CT chest 5/10/2022 was unremarkable.    Dr. Denson notes, echocardiogram reports reviewed.  Heart monitor reviewed, independently interpreted    Most recent echocardiogram reviewed, independently interpreted    Medical Decision Making:  Problem List Items Addressed This Visit       PFO (patent foramen ovale) - based on agitated saline study (Chronic)    Relevant Medications    ivabradine (CORLANOR) 5 MG Tab tablet     Other Visit Diagnoses       Sinus tachycardia        Relevant Orders    TSH WITH REFLEX TO FT4          Significant tachycardia also contributing to her hypoxemia I think.  She could not tolerate metoprolol, diltiazem.  We will try ivabradine.  I could not find TSH in recent labs.  We will order TSH T4.  She would like to wait a little bit longer try medications  before entertaining PFO " closure.    We will see her back in 3 months.      Recent labs, recent echocardiogram reviewed.      This note was dictated using Dragon speech recognition software.    Luis Enrique DAVID  Interventional cardiologist  [facility] V[facility], [facility]  [address] [address]  Phone: [phone]  Fax: [phone]

## 2023-01-12 ENCOUNTER — PATIENT MESSAGE (OUTPATIENT)
Dept: CARDIOLOGY | Facility: MEDICAL CENTER | Age: 48
End: 2023-01-12
Payer: MEDICAID

## 2023-01-12 ENCOUNTER — TELEPHONE (OUTPATIENT)
Dept: CARDIOLOGY | Facility: MEDICAL CENTER | Age: 48
End: 2023-01-12
Payer: MEDICAID

## 2023-01-12 NOTE — TELEPHONE ENCOUNTER
PA started    Pharmacy claim for Corlanor 5MG tablets, prescriber Danyel, has been rejected and requires prior authorization for coverage.  Non-preferred medications may have a higher patient co-pay than the health insurance plan's preferred medications.  If clinically appropriate, you may change the prescription. A therapeutic alternative may be available. Questions on alternatives? Contact [facility] at [phone]. You can also review [address]'s formulary online or call them directly. Compare the original medication with possible alternatives:  Drug Name  PA Requirement*  Corlanor 5MG tablets Required  Atenolol Not Required  Bisoprolol Fumarate Not Required  Bystolic Not Required  Labetalol HCl Not Required  Metoprolol Succinate ER Not Required  Metoprolol Tartrate Not Required  Propranolol HCl Not Required  Propranolol HCl ER Not Required  Carvedilol Required  Nadolol Required

## 2023-01-12 NOTE — TELEPHONE ENCOUNTER
PA sent to esther DREW Key: [id number] - PA Case ID: [id number] - Rx #: [id number] help? Call us at [phone]  Status  Sent to [facility]  Drug  Corlanor 5MG tablets  Form  [employer] [place] Medicaid Electronic PA Form  Original Claim Info  75 Prior Authorization Required

## 2023-01-12 NOTE — TELEPHONE ENCOUNTER
Luis Enrique Montaño M.D.  You 25 minutes ago (3:32 PM)     Find out if they can cover for diagnosis inappropriate sinus tachycardia      To LL: Is it possible to see if we could get a PA to cover that diagnosis from [place]? Thank you!

## 2023-01-12 NOTE — TELEPHONE ENCOUNTER
PA denied as pt does not meet criteria for approval.     Criteria: Recipient must have a diagnosis of chronic heart failure with left ventricular ejection fraction (LVEF) 35% and recipient must be in normal sinus rhythm.

## 2023-01-12 NOTE — TELEPHONE ENCOUNTER
To [place]: Patient was denied prior authorization for Ivabradine prescription as she doesn't meet their criteria for approval. Criteria: Recipient must have a diagnosis of chronic heart failure with left ventricular ejection fraction (LVEF) 35% and recipient must be in normal sinus rhythm. Please advise, thank you!

## 2023-01-13 ENCOUNTER — PATIENT MESSAGE (OUTPATIENT)
Dept: CARDIOLOGY | Facility: MEDICAL CENTER | Age: 48
End: 2023-01-13
Payer: MEDICAID

## 2023-01-13 NOTE — TELEPHONE ENCOUNTER
Luis Enrique Montaño M.D.  You 37 minutes ago (1:51 PM)     Try bisoprolol 2.5 mg daily, if she wants to try something.      [employer] message sent to patient, awaiting patient response and will follow up as needed.

## 2023-01-13 NOTE — TELEPHONE ENCOUNTER
To [place]: Apparently they aren't approving specifically because the patient is in sinus tachycardia.

## 2023-04-25 ENCOUNTER — PATIENT MESSAGE (OUTPATIENT)
Dept: CARDIOLOGY | Facility: MEDICAL CENTER | Age: 48
End: 2023-04-25

## 2023-04-25 NOTE — PATIENT COMMUNICATION
Attempted to call patient from 12:55pm- 1:10pm called multiple times and no answer and unable to leave messages because mailbox is full. This virtual visit will be marked as a no-show.

## 2023-04-28 ENCOUNTER — TELEPHONE (OUTPATIENT)
Dept: CARDIOLOGY | Facility: MEDICAL CENTER | Age: 48
End: 2023-04-28
Payer: MEDICAID

## 2023-04-28 NOTE — LETTER
April 28, 2023        Kristy Mcneill [address]  [place] [address]        Dear Kristy,      We have been unable to reach you regarding rescheduling you virtual visit with Dr. Montaño. We attempted to reach you several times at your scheduled appointment time on 4/25/2023, but were unsuccessful and unable to leave you a voice message.     Please call our office at [phone] to reschedule your appointment.           Thank you,           FAHAD AriasN, RN  Structural Heart Program Nurse Coordinator

## 2023-04-28 NOTE — TELEPHONE ENCOUNTER
MA was unable to reach patient for virtual visit earlier this week. Attempted to reach patient to reschedule appointment. Unable to leave message as voicemail box was full.    Letter generated and placed in mail to patient.

## 2024-04-25 ENCOUNTER — TELEPHONE (OUTPATIENT)
Dept: HEALTH INFORMATION MANAGEMENT | Facility: OTHER | Age: 49
End: 2024-04-25
Payer: MEDICAID

## 2024-05-22 ENCOUNTER — TELEPHONE (OUTPATIENT)
Dept: HEALTH INFORMATION MANAGEMENT | Facility: OTHER | Age: 49
End: 2024-05-22
Payer: MEDICAID

## 2024-07-09 DIAGNOSIS — Z01.810 PRE-OPERATIVE CARDIOVASCULAR EXAMINATION: Primary | ICD-10-CM

## 2024-07-11 LAB — EKG IMPRESSION: NORMAL

## 2024-07-11 PROCEDURE — 93010 ELECTROCARDIOGRAM REPORT: CPT | Performed by: INTERNAL MEDICINE

## 2024-12-02 ENCOUNTER — TELEPHONE (OUTPATIENT)
Dept: PHARMACY | Facility: MEDICAL CENTER | Age: 49
End: 2024-12-02

## 2024-12-02 ENCOUNTER — OFFICE VISIT (OUTPATIENT)
Dept: SLEEP MEDICINE | Facility: MEDICAL CENTER | Age: 49
End: 2024-12-02
Attending: INTERNAL MEDICINE
Payer: MEDICAID

## 2024-12-02 VITALS
SYSTOLIC BLOOD PRESSURE: 124 MMHG | DIASTOLIC BLOOD PRESSURE: 70 MMHG | WEIGHT: 196 LBS | OXYGEN SATURATION: 94 % | BODY MASS INDEX: 37 KG/M2 | HEIGHT: 61 IN | HEART RATE: 118 BPM

## 2024-12-02 DIAGNOSIS — J45.40 MODERATE PERSISTENT ASTHMA WITHOUT COMPLICATION: ICD-10-CM

## 2024-12-02 DIAGNOSIS — Z71.85 VACCINE COUNSELING: ICD-10-CM

## 2024-12-02 DIAGNOSIS — J98.11 CHRONIC ATELECTASIS: ICD-10-CM

## 2024-12-02 DIAGNOSIS — Z87.891 FORMER SMOKER: Chronic | ICD-10-CM

## 2024-12-02 PROCEDURE — 99212 OFFICE O/P EST SF 10 MIN: CPT | Performed by: INTERNAL MEDICINE

## 2024-12-02 PROCEDURE — 99215 OFFICE O/P EST HI 40 MIN: CPT | Performed by: INTERNAL MEDICINE

## 2024-12-02 PROCEDURE — RXMED WILLOW AMBULATORY MEDICATION CHARGE: Performed by: INTERNAL MEDICINE

## 2024-12-02 PROCEDURE — 3078F DIAST BP <80 MM HG: CPT | Performed by: INTERNAL MEDICINE

## 2024-12-02 PROCEDURE — 3074F SYST BP LT 130 MM HG: CPT | Performed by: INTERNAL MEDICINE

## 2024-12-02 RX ORDER — FLUTICASONE FUROATE AND VILANTEROL 100; 25 UG/1; UG/1
1 POWDER RESPIRATORY (INHALATION) DAILY
Qty: 180 EACH | Refills: 3 | Status: SHIPPED | OUTPATIENT
Start: 2024-12-02

## 2024-12-02 RX ORDER — LOSARTAN POTASSIUM 50 MG/1
TABLET ORAL
COMMUNITY
Start: 2024-01-18

## 2024-12-02 NOTE — TELEPHONE ENCOUNTER
Received Refill PA request via [employer]  for fluticasone furoate-vilanterol (BREO ELLIPTA) 100-25 MCG/ACT AEROSOL POWDER, BREATH ACTIVATED . (Quantity:60, Day Supply:30)     Insurance: [employer]  Member ID:  [id number]  BIN: [id number]  PCN: [id number]  [id number]     Ran Test claim via [place] & medication Pays for a $0 copay. Will outreach to patient to offer specialty pharmacy services and or release to preferred pharmacy    Spoke with Kristy and she would like to fill with [facility]. Will send 12/3 for delivery 12/5 via [employer].

## 2024-12-02 NOTE — PROGRESS NOTES
"Pulmonary Consult      Reason for consult: Tobacco abuse, asthma, nocturnal hypoxia, chronic atelectasis from obesity     Chief Complaint:  Chief Complaint   Patient presents with    Follow-Up     Last seen 04/21/2022 w/ Dr. Treviño  Dx: Asthma, Dyspnea on exertion, Exercise hypoxemia       HPI:   Kristy Peters is a pleasant 49 y.o. female     TODAY:  Since last clinic visit:   In April 2022 she was started on Breo and Albuterol. PFTs, ECHO, CT were ordered. She was subsequently lost to follow up.   She is concerned because she occasionally checks her oxygen and it is in the upper 80s.   She snores but is a little reluctant to consider wearing a CPAP.   New imaging: n/a   New labs: n/a  New ECHO: n/a  There are no new ROS complaints today.       Pulmonary History:  From April 2022 with Dr. Treviño:  \" history of tobacco smoking 20+pkyr, quit 12/2021 who presents to pulmonary clinic for dyspnea.      Patient reports history of asthma and was initially referred to pulmonary for asthma management, however she now presents with ongoing dyspnea on exertion since her hospitalization for pneumonia in 12/2021. She [facility] w/[facility] and was discharged home on O2 - since discharge, she is slowly improving her activity, now off O2. She can walk pretty far on level ground since her and her  go for routine walks around their property, however she becomes dyspneic when she exerts herself with activities like vacuuming and other resistance work. Denies any significant exposures, used to be around chickens that were owned by family members.\"      Past Medical History:   Diagnosis Date    Asthma     Back pain     Chickenpox     Daytime sleepiness     Depression     Diabetes (HCC)     GERD (gastroesophageal reflux disease)     Heartburn     Insomnia     Irregular periods     Morning headache     Nasal drainage     Obesity     PFO (patent foramen ovale) - based on agitated saline study 11/4/2022    " Psychiatric disorder     bipolar    Shortness of breath     Wears glasses     Wheezing        Past Surgical History:   Procedure Laterality Date    LAPAROTOMY         Social History     Socioeconomic History    Marital status: Single     Spouse name: Not on file    Number of children: Not on file    Years of education: Not on file    Highest education level: Associate degree: occupational, technical, or vocational program   Occupational History    Not on file   Tobacco Use    Smoking status: Former     Current packs/day: 0.00     Average packs/day: 1 pack/day for 20.0 years (20.0 ttl pk-yrs)     Types: Cigarettes     Start date: 2001     Quit date: 2021     Years since quittin.9    Smokeless tobacco: Never   Vaping Use    Vaping status: Never Used   Substance and Sexual Activity    Alcohol use: No     Alcohol/week: 0.0 oz     Comment: Notes prior rare alcohol use, notes previously stopped use, denies current use    Drug use: No     Comment: Notes prior occasional marijuana use, previously stopped use, denies current use    Sexual activity: Not on file   Other Topics Concern    Not on file   Social History Narrative    Not on file     Social Drivers of Health     Financial Resource Strain: Medium Risk (2021)    Overall Financial Resource Strain (CARDIA)     Difficulty of Paying Living Expenses: Somewhat hard   Food Insecurity: Food Insecurity Present (2021)    Hunger Vital Sign     Worried About Running Out of Food in the Last Year: Sometimes true     Ran Out of Food in the Last Year: Never true   Transportation Needs: Unmet Transportation Needs (2021)    PRAPARE - Transportation     Lack of Transportation (Medical): Yes     Lack of Transportation (Non-Medical): Yes   Physical Activity: Insufficiently Active (2021)    Exercise Vital Sign     Days of Exercise per Week: 1 day     Minutes of Exercise per Session: 20 min   Stress: No Stress Concern Present (2021)    [place]  [place] [employer] - Occupational Stress Questionnaire     Feeling of Stress : Only a little   Recent Concern: Stress - Stress Concern Present (12/3/2021)    [place] [employer] - Occupational Stress Questionnaire     Feeling of Stress : To some extent   Social Connections: Socially Isolated (12/24/2021)    Social Connection and Isolation Panel [NHANES]     Frequency of Communication with Friends and Family: Once a week     Frequency of Social Gatherings with Friends and Family: Once a week     Attends Orthodoxy Services: Never     Active Member of Clubs or Organizations: No     Attends Club or Organization Meetings: Never     Marital Status: Living with partner   Intimate Partner Violence: Not on file   Housing Stability: Low Risk  (12/24/2021)    Housing Stability Vital Sign     Unable to Pay for Housing in the Last Year: No     Number of Places Lived in the Last Year: 1     Unstable Housing in the Last Year: No          Family History   Problem Relation Age of Onset    Asthma Father     Stroke Paternal Aunt     Alzheimer's Disease Maternal Grandmother     Respiratory Disease Maternal Grandfather     Heart Failure Paternal Grandmother     Hypertension Paternal Grandmother        Current Outpatient Medications on File Prior to Visit   Medication Sig Dispense Refill    ivabradine (CORLANOR) 5 MG Tab tablet Take 1 Tablet by mouth 2 times a day with meals. 60 Tablet 11    fluticasone furoate-vilanterol (BREO ELLIPTA) 100-25 MCG/ACT AEROSOL POWDER, BREATH ACTIVATED Inhale 1 Puff every day. Pt needs a follow up appointment for further refills 1 Each 0    glucose blood strip OneTouch Verio test strips   CHECK 3 TIMES A DAY ON 2 DAYS OF THE WEEK.      ONETOUCH VERIO strip       metFORMIN (GLUCOPHAGE) 500 MG Tab Take 500 mg by mouth 2 times a day with meals.      ergocalciferol (DRISDOL) 58301 UNIT capsule Take 50,000 Units by mouth. Twice a week      OXcarbazepine (TRILEPTAL) 150 MG Tab  "Take 300 mg by mouth every day.      OXcarbazepine (TRILEPTAL) 150 MG Tab Take  by mouth every day.      clonazePAM (KLONOPIN) 0.5 MG Tab Take 0.5 mg by mouth as needed.      PROAIR  (90 BASE) MCG/ACT Aero Soln inhalation aerosol Inhale 1-2 Puffs as needed.      albuterol (PROVENTIL) 2.5mg/3ml Nebu Soln solution for nebulization every four hours as needed.      montelukast (SINGULAIR) 10 MG Tab Take 10 mg by mouth every day.       No current facility-administered medications on file prior to visit.       Allergies: Bactrim [sulfamethoxazole w-trimethoprim], Erythromycin, and Metoprolol      ROS: A 12 point ROS was performed on intake and during my interview. ROS negative unless specifically noted in HPI.     Vitals:  /70 (BP Location: Left arm, Patient Position: Sitting, BP Cuff Size: Large adult)   Pulse (!) 118   Ht 1.549 m (5' 1\")   Wt 88.9 kg (196 lb)   SpO2 94%     Wt Readings from Last 3 Encounters:   12/02/24 88.9 kg (196 lb)   01/11/23 90.7 kg (200 lb)   11/23/22 92.1 kg (203 lb)       Physical Exam:  Physical Exam  Vitals reviewed.   Constitutional:       General: She is not in acute distress.     Appearance: She is obese. She is not ill-appearing, toxic-appearing or diaphoretic.   Eyes:      General:         Right eye: No discharge.         Left eye: No discharge.      Pupils: Pupils are equal, round, and reactive to light.   Cardiovascular:      Rate and Rhythm: Tachycardia present.      Pulses: Normal pulses.   Pulmonary:      Effort: Pulmonary effort is normal.      Breath sounds: Normal breath sounds.   Musculoskeletal:      Right lower leg: No edema.      Left lower leg: No edema.   Skin:     General: Skin is warm.      Capillary Refill: Capillary refill takes less than 2 seconds.      Coloration: Skin is not jaundiced.   Neurological:      General: No focal deficit present.      Mental Status: She is alert.   Psychiatric:         Behavior: Behavior normal.         Laboratory Data: I " personally reviewed labs including historical lab trends.       There is no immunization history on file for this patient.      PFTs as reviewed by me personally show: n/a     Imaging as reviewed by me personally show:  n/a    Assessment/Plan:    Pulmonary problem list:  #Possible asthma  #Tobacco abuse -  in remission  #Vaccine counseling   #Chronic atelectasis secondary to obesity    Discussion: I believe most of her symptoms and findings are related to obesity and atelectasis. It is possible she has asthma and/or COPD in addition to likely GLORIA.    Plan:  -weight loss advised  -advised using airway flutter valve more frequently  -ICS/LABA renewed  -PFT order renewed  -OPO ordered. Formal sleep study deferred by patient for now.   -discussed vaccines - she is adamantly opposed despite counseling on high risk status    RTC in 3 months or PRN    Total consult time was 45 minutes. This includes reviewing previous provider notes, personally reviewing previous imaging and spirometry, educating the patient about their disease process, and inhaler education.   __________  Orion Garcia, DO  Pulmonary and Critical Care Medicine  [facility]    Please note that this dictation was created using voice recognition software. The accuracy of the dictation is limited to the abilities of the software. I have made every reasonable attempt to correct obvious errors, but I expect that there are errors of grammar and possibly content that I did not discover before finalizing the note.

## 2024-12-04 ENCOUNTER — PHARMACY VISIT (OUTPATIENT)
Dept: PHARMACY | Facility: MEDICAL CENTER | Age: 49
End: 2024-12-04
Payer: COMMERCIAL

## 2025-01-08 PROCEDURE — RXMED WILLOW AMBULATORY MEDICATION CHARGE: Performed by: INTERNAL MEDICINE

## 2025-01-09 ENCOUNTER — TELEPHONE (OUTPATIENT)
Dept: PHARMACY | Facility: MEDICAL CENTER | Age: 50
End: 2025-01-09
Payer: MEDICAID

## 2025-01-09 DIAGNOSIS — J45.40 MODERATE PERSISTENT ASTHMA WITHOUT COMPLICATION: ICD-10-CM

## 2025-01-09 RX ORDER — ALBUTEROL SULFATE 0.83 MG/ML
2.5 SOLUTION RESPIRATORY (INHALATION) EVERY 4 HOURS PRN
Qty: 180 ML | Refills: 3 | Status: SHIPPED | OUTPATIENT
Start: 2025-01-09

## 2025-01-09 NOTE — TELEPHONE ENCOUNTER
I called and spoke gretta Wagner in regards to her refill for Breo. She is in need of refills on her Albuterol. Can you send a new RX for Albuterol to [facility] in [facility]?    Thanks!

## 2025-01-13 ENCOUNTER — PHARMACY VISIT (OUTPATIENT)
Dept: PHARMACY | Facility: MEDICAL CENTER | Age: 50
End: 2025-01-13
Payer: COMMERCIAL

## 2025-02-05 PROCEDURE — RXMED WILLOW AMBULATORY MEDICATION CHARGE: Performed by: INTERNAL MEDICINE

## 2025-02-07 ENCOUNTER — PHARMACY VISIT (OUTPATIENT)
Dept: PHARMACY | Facility: MEDICAL CENTER | Age: 50
End: 2025-02-07
Payer: COMMERCIAL

## 2025-02-18 ENCOUNTER — PATIENT MESSAGE (OUTPATIENT)
Dept: SLEEP MEDICINE | Facility: MEDICAL CENTER | Age: 50
End: 2025-02-18
Payer: MEDICAID

## 2025-03-03 ENCOUNTER — APPOINTMENT (OUTPATIENT)
Dept: SLEEP MEDICINE | Facility: MEDICAL CENTER | Age: 50
End: 2025-03-03
Attending: INTERNAL MEDICINE
Payer: MEDICAID

## 2025-03-05 ENCOUNTER — TELEPHONE (OUTPATIENT)
Dept: SLEEP MEDICINE | Facility: MEDICAL CENTER | Age: 50
End: 2025-03-05
Payer: MEDICAID

## 2025-03-05 NOTE — TELEPHONE ENCOUNTER
Attempted to call, no answer left pt a voicemail regarding her upcoming virtual appointment w Dr. Garcia 03/07/25, informed her this appt is to review PFT results and pt has not completed the testing due to constant rescheduling, however she does have an upcoming office visit w Dr. Garcia 05/28/25 w same day PFTs , let pt know she can wait until May or if she has further questions/concerns she wants to be addressed she is welcomed to keep appt.

## 2025-03-07 ENCOUNTER — TELEPHONE (OUTPATIENT)
Dept: SLEEP MEDICINE | Facility: MEDICAL CENTER | Age: 50
End: 2025-03-07
Payer: MEDICAID

## 2025-03-07 NOTE — TELEPHONE ENCOUNTER
Attempted multiple times to reach pt, no answer, pt had virtual appt w Dr. Garcia @1:50pm, left voicemail letting pt know that after the 10 minute estevan pt's appt would be cancelled/rescheduled, pt already has a f/u appt scheduled for 5/28/25 w same day PFT

## 2025-03-12 PROCEDURE — RXMED WILLOW AMBULATORY MEDICATION CHARGE: Performed by: INTERNAL MEDICINE

## 2025-03-13 ENCOUNTER — PHARMACY VISIT (OUTPATIENT)
Dept: PHARMACY | Facility: MEDICAL CENTER | Age: 50
End: 2025-03-13
Payer: COMMERCIAL

## 2025-04-01 PROCEDURE — RXMED WILLOW AMBULATORY MEDICATION CHARGE: Performed by: INTERNAL MEDICINE

## 2025-04-02 ENCOUNTER — PHARMACY VISIT (OUTPATIENT)
Dept: PHARMACY | Facility: MEDICAL CENTER | Age: 50
End: 2025-04-02
Payer: COMMERCIAL

## 2025-04-29 PROCEDURE — RXMED WILLOW AMBULATORY MEDICATION CHARGE: Performed by: INTERNAL MEDICINE

## 2025-05-05 ENCOUNTER — PHARMACY VISIT (OUTPATIENT)
Dept: PHARMACY | Facility: MEDICAL CENTER | Age: 50
End: 2025-05-05
Payer: COMMERCIAL

## 2025-05-28 ENCOUNTER — TELEPHONE (OUTPATIENT)
Dept: SLEEP MEDICINE | Facility: MEDICAL CENTER | Age: 50
End: 2025-05-28

## 2025-05-28 ENCOUNTER — OFFICE VISIT (OUTPATIENT)
Dept: SLEEP MEDICINE | Facility: MEDICAL CENTER | Age: 50
End: 2025-05-28
Attending: INTERNAL MEDICINE
Payer: MEDICAID

## 2025-05-28 VITALS
OXYGEN SATURATION: 90 % | WEIGHT: 190 LBS | HEIGHT: 61 IN | HEART RATE: 114 BPM | DIASTOLIC BLOOD PRESSURE: 62 MMHG | BODY MASS INDEX: 35.87 KG/M2 | SYSTOLIC BLOOD PRESSURE: 132 MMHG

## 2025-05-28 VITALS — BODY MASS INDEX: 35.87 KG/M2 | WEIGHT: 190 LBS | HEIGHT: 61 IN

## 2025-05-28 DIAGNOSIS — R05.3 CHRONIC COUGH: ICD-10-CM

## 2025-05-28 DIAGNOSIS — J98.11 CHRONIC ATELECTASIS: ICD-10-CM

## 2025-05-28 DIAGNOSIS — Z87.891 FORMER SMOKER: Chronic | ICD-10-CM

## 2025-05-28 DIAGNOSIS — J96.11 CHRONIC HYPOXIC RESPIRATORY FAILURE (HCC): ICD-10-CM

## 2025-05-28 DIAGNOSIS — J44.9: Primary | ICD-10-CM

## 2025-05-28 PROCEDURE — 94761 N-INVAS EAR/PLS OXIMETRY MLT: CPT | Performed by: INTERNAL MEDICINE

## 2025-05-28 PROCEDURE — 94726 PLETHYSMOGRAPHY LUNG VOLUMES: CPT | Performed by: INTERNAL MEDICINE

## 2025-05-28 PROCEDURE — 99214 OFFICE O/P EST MOD 30 MIN: CPT | Performed by: INTERNAL MEDICINE

## 2025-05-28 PROCEDURE — 94729 DIFFUSING CAPACITY: CPT | Performed by: INTERNAL MEDICINE

## 2025-05-28 PROCEDURE — 94060 EVALUATION OF WHEEZING: CPT | Performed by: INTERNAL MEDICINE

## 2025-05-28 RX ORDER — PREDNISONE 50 MG/1
50 TABLET ORAL DAILY
Qty: 10 TABLET | Refills: 0 | Status: SHIPPED | OUTPATIENT
Start: 2025-05-28

## 2025-05-28 NOTE — TELEPHONE ENCOUNTER
Prior Authorization for Budeson-Glycopyrrol-Formoterol (BREZTRI AEROSPHERE) 160-9-4.8 MCG/ACT Aerosol  (Quantity: 10.7 G, Days: 30) has been submitted via Cover My Meds: Key ([id number])    Insurance: [employer] Medicaid    Will follow up in 24-48 business hours.

## 2025-05-28 NOTE — PROCEDURES
Multi-Ox Readings  Multi Ox #1 Room air   O2 sat % at rest 86   O2 sat % on exertion     O2 sat average on exertion     Multi Ox #2 2 LPM   O2 sat % at rest 93   O2 sat % on exertion 90-91   O2 sat average on exertion

## 2025-05-28 NOTE — TELEPHONE ENCOUNTER
Received New Start request via [employer]  for Budeson-Glycopyrrol-Formoterol (BREZTRI AEROSPHERE) 160-9-4.8 MCG/ACT Aerosol . (Quantity:10.7, Day Supply:30)     Insurance: [employer] Medicaid  Member ID:  [id number]  BIN: [id number]  PCN: [id number]  [id number]     Ran Test claim via [place] & medication Rejects stating prior authorization is required.    Thank you,   Calderon Wells, [facility]  Pharmacy Liaison

## 2025-05-28 NOTE — PROCEDURES
Good patient effort & cooperation.  The results of this test meet the ATS/ERS standards for acceptability & reproducibility.  Test was performed on the GoGuide Body Plethysmograph-Elite DX system.  Predicted values were GLI-2012 for spirometry, GLI- 2020 for DLCO, ITS for Lung Volumes.  The DLCO was uncorrected for Hgb.  A bronchodilator of albuterol HFA -2puffs via spacer administered.  DLCO performed during dilation period.    Baseline spirometry demonstrates a severe obstruction with a negative bronchodilator response.  Flow volume loops are consistent with spirometric data.  Lung volumes demonstrate severe air trapping reduced ERV.  DLCO is normal.    Summary:  Findings are consistent with uncontrolled asthma versus COPD with air trapping.  There are also signs of chronic atelectasis from obesity.    I, Orion Garcia DO, am the author of this note.     Orion Garcia DO, [facility]  Staff Pulmonologist and Intensivist  [facility]     Please note that this dictation was created using voice recognition software. The accuracy of the dictation is limited to the abilities of the software. I have made every reasonable attempt to correct obvious errors, but I expect that there are errors of grammar and possibly content that I did not discover before finalizing the note.

## 2025-05-28 NOTE — PROGRESS NOTES
"Pulmonary Consult      Reason for consult: Tobacco abuse, asthma, nocturnal hypoxia, chronic atelectasis from obesity     Chief Complaint:  Chief Complaint   Patient presents with    Follow-Up     Last seen 12/02/24 w/ Dr. Garcia  Dx: Chronic atelectasis, Moderate persistent asthma without complication    Results     PFT 05/28/25     HPI:   Kristy Peters is a pleasant 49 y.o. female     TODAY:  Since last clinic visit:   0 ED or Urgent Care visits in the past year for respiratory issues.  Has not been hospitalized for respiratory issues.   Recent pulmonary medication changes: n/a  Recent Antibiotics/prednisone: n/a  New imaging: n/a   New labs: n/a  New ECHO: n/a  She continues to have dyspnea with exertion and with doing ADLs. Oxygenation at baseline is 90% today in clinic on room air. She has active wheezing on exam. She stated she is using her inhalers as prescribed and has been using a flutter valve in the evenings.       Pulmonary History:  Dec 2024:  In April 2022 she was started on Breo and Albuterol. PFTs, ECHO, CT were ordered. She was subsequently lost to follow up.   She is concerned because she occasionally checks her oxygen and it is in the upper 80s.   She snores but is a little reluctant to consider wearing a CPAP.   New imaging: n/a   New labs: n/a  New ECHO: n/a  There are no new ROS complaints today.    From April 2022 with Dr. Treviño:  \" history of tobacco smoking 20+pkyr, quit 12/2021 who presents to pulmonary clinic for dyspnea.      Patient reports history of asthma and was initially referred to pulmonary for asthma management, however she now presents with ongoing dyspnea on exertion since her hospitalization for pneumonia in 12/2021. [facility] and was discharged home on O2 - since discharge, she is slowly improving her activity, now off O2. She can walk pretty far on level ground since her and her  go for routine walks around their property, " "however she becomes dyspneic when she exerts herself with activities like vacuuming and other resistance work. Denies any significant exposures, used to be around chickens that were owned by family members.\"      Past Medical History:   Diagnosis Date    Asthma     Back pain     Chickenpox     Chronic atelectasis 12/02/2024    2/2 obesity      Cough     Daytime sleepiness     Depression     Diabetes (HCC)     Former smoker 12/02/2024    GERD (gastroesophageal reflux disease)     Heartburn     Insomnia     Irregular periods     Morning headache     Nasal drainage     Obesity     PFO (patent foramen ovale) - based on agitated saline study 11/04/2022    Psychiatric disorder     bipolar    Shortness of breath     Vaccine counseling 12/02/2024    Declines vaccines despite counseling on high-risk status      Wears glasses     Wheezing        Past Surgical History:   Procedure Laterality Date    LAPAROTOMY         Social History     Socioeconomic History    Marital status: Single     Spouse name: Not on file    Number of children: Not on file    Years of education: Not on file    Highest education level: Associate degree: occupational, technical, or vocational program   Occupational History    Not on file   Tobacco Use    Smoking status: Former     Current packs/day: 0.00     Average packs/day: 1 pack/day for 20.0 years (20.0 ttl pk-yrs)     Types: Cigarettes     Start date: 12/8/2001     Quit date: 12/8/2021     Years since quitting: 3.4    Smokeless tobacco: Never   Vaping Use    Vaping status: Never Used   Substance and Sexual Activity    Alcohol use: No     Alcohol/week: 0.0 oz     Comment: Notes prior rare alcohol use, notes previously stopped use, denies current use    Drug use: No     Comment: Notes prior occasional marijuana use, previously stopped use, denies current use    Sexual activity: Not on file   Other Topics Concern    Not on file   Social History Narrative    Not on file     Social Drivers of Health "     Financial Resource Strain: Medium Risk (12/24/2021)    Overall Financial Resource Strain (CARDIA)     Difficulty of Paying Living Expenses: Somewhat hard   Food Insecurity: Food Insecurity Present (12/24/2021)    Hunger Vital Sign     Worried About Running Out of Food in the Last Year: Sometimes true     Ran Out of Food in the Last Year: Never true   Transportation Needs: Unmet Transportation Needs (12/24/2021)    PRAPARE - Transportation     Lack of Transportation (Medical): Yes     Lack of Transportation (Non-Medical): Yes   Physical Activity: Insufficiently Active (12/24/2021)    Exercise Vital Sign     Days of Exercise per Week: 1 day     Minutes of Exercise per Session: 20 min   Stress: No Stress Concern Present (12/24/2021)    [place] [employer] - Occupational Stress Questionnaire     Feeling of Stress : Only a little   Recent Concern: Stress - Stress Concern Present (12/3/2021)    [place] - Occupational Stress Questionnaire     Feeling of Stress : To some extent   Social Connections: Socially Isolated (12/24/2021)    Social Connection and Isolation Panel [NHANES]     Frequency of Communication with Friends and Family: Once a week     Frequency of Social Gatherings with Friends and Family: Once a week     Attends Denominational Services: Never     Active Member of Clubs or Organizations: No     Attends Club or Organization Meetings: Never     Marital Status: Living with partner   Intimate Partner Violence: Not on file   Housing Stability: Low Risk  (12/24/2021)    Housing Stability Vital Sign     Unable to Pay for Housing in the Last Year: No     Number of Places Lived in the Last Year: 1     Unstable Housing in the Last Year: No          Family History   Problem Relation Age of Onset    Asthma Father     Stroke Paternal Aunt     Alzheimer's Disease Maternal Grandmother     Respiratory Disease Maternal Grandfather     Heart Failure Paternal Grandmother      "Hypertension Paternal Grandmother        Current Outpatient Medications on File Prior to Visit   Medication Sig Dispense Refill    albuterol (PROVENTIL) 2.5mg/3ml Nebu Soln solution for nebulization Take 3 mL by nebulization every four hours as needed for Shortness of Breath. 180 mL 3    losartan (COZAAR) 50 MG Tab Take 1 tablet every day by oral route for 90 days.      fluticasone furoate-vilanterol (BREO ELLIPTA) 100-25 MCG/ACT AEROSOL POWDER, BREATH ACTIVATED Inhale 1 Puff every day. Rinse mouth after use. 180 Each 3    glucose blood strip OneTouch Verio test strips   CHECK 3 TIMES A DAY ON 2 DAYS OF THE WEEK.      ONETOUCH VERIO strip       metFORMIN (GLUCOPHAGE) 500 MG Tab Take 500 mg by mouth 2 times a day with meals.      ergocalciferol (DRISDOL) 59632 UNIT capsule Take 50,000 Units by mouth. Twice a week      OXcarbazepine (TRILEPTAL) 150 MG Tab Take 300 mg by mouth every day.      OXcarbazepine (TRILEPTAL) 150 MG Tab Take  by mouth every day.      clonazePAM (KLONOPIN) 0.5 MG Tab Take 0.5 mg by mouth as needed.      montelukast (SINGULAIR) 10 MG Tab Take 10 mg by mouth every day.       No current facility-administered medications on file prior to visit.       Allergies: Bactrim [sulfamethoxazole w-trimethoprim], Erythromycin, and Metoprolol      ROS: A 12 point ROS was performed on intake and during my interview. ROS negative unless specifically noted in HPI.     Vitals:  /62 (BP Location: Left arm, Patient Position: Sitting, BP Cuff Size: Adult)   Pulse (!) 114   Ht 1.549 m (5' 1\")   Wt 86.2 kg (190 lb)   SpO2 90%     Wt Readings from Last 3 Encounters:   12/02/24 88.9 kg (196 lb)   01/11/23 90.7 kg (200 lb)   11/23/22 92.1 kg (203 lb)       Physical Exam:  Physical Exam  Vitals reviewed.   Constitutional:       General: She is not in acute distress.     Appearance: She is obese. She is not ill-appearing, toxic-appearing or diaphoretic.   Eyes:      General:         Right eye: No discharge.       "   Left eye: No discharge.      Pupils: Pupils are equal, round, and reactive to light.   Cardiovascular:      Rate and Rhythm: Tachycardia present.      Pulses: Normal pulses.   Pulmonary:      Effort: Pulmonary effort is normal.      Breath sounds: Normal breath sounds.   Musculoskeletal:      Right lower leg: No edema.      Left lower leg: No edema.   Skin:     General: Skin is warm.      Capillary Refill: Capillary refill takes less than 2 seconds.      Coloration: Skin is not jaundiced.   Neurological:      General: No focal deficit present.      Mental Status: She is alert.   Psychiatric:         Behavior: Behavior normal.         Laboratory Data: I personally reviewed labs including historical lab trends.       There is no immunization history on file for this patient.      PFTs as reviewed by me personally show: n/a     Imaging as reviewed by me personally show:  n/a    Assessment/Plan:    Pulmonary problem list:  #Possible asthma  #Tobacco abuse -  in remission  #Vaccine counseling   #Chronic atelectasis secondary to obesity    Discussion: I reviewed Kristy's PFTs with her and her father. She has severe obstruction c/w uncontrolled asthma vs. COPD. She is also wheezing on exam.     Plan:  -weight loss advised  -advised using airway flutter valve more frequently - before each use of inhaler   -replace ICS/LABA with Breztri  -Continue PRN albuterol  -Home oxygen ordered; she required 2 lpm with ambulation today  -Labs ordered: A1AT, allergy testing, IgE  -Imaging ordered: CT chest w/o contrast   -discussed vaccines at previous visit - she is adamantly opposed despite counseling on high risk status    RTC in 3 months or PRN    Total consult time was 56 minutes. This includes reviewing previous provider notes, personally reviewing previous imaging and spirometry, educating the patient about their disease process, and inhaler education.   __________  Orion Garcia, DO  Pulmonary and Critical Care  [facility]    Please note that this dictation was created using voice recognition software. The accuracy of the dictation is limited to the abilities of the software. I have made every reasonable attempt to correct obvious errors, but I expect that there are errors of grammar and possibly content that I did not discover before finalizing the note.

## 2025-05-28 NOTE — PATIENT INSTRUCTIONS
Please get your labs done. I am checking for alpha one antitrypsin deficiency among other issues that can effect your breathing.   Your Breztri replaces Breo. It is 2 puffs twice daily.   Please use your flutter valve before your inhaler at least twice daily.   Please schedule your CT scan of the chest.

## 2025-05-29 NOTE — TELEPHONE ENCOUNTER
Prior Authorization for Budeson-Glycopyrrol-Formoterol (BREZTRI AEROSPHERE) 160-9-4.8 MCG/ACT Aerosol  has been denied for a quantity of 10.7 g , day supply 30    Prior authorization was denied per the following: Criteria Not met  **Denial Letter scanned into media    Prior Authorization denial reference number: [id number]  Insurance: [employer]    Please advise.    Thank you,   Calderon Wells CPhT  Pharmacy Liaison

## 2025-05-30 ENCOUNTER — TELEPHONE (OUTPATIENT)
Dept: SLEEP MEDICINE | Facility: MEDICAL CENTER | Age: 50
End: 2025-05-30
Payer: MEDICAID

## 2025-05-30 DIAGNOSIS — J44.9: Primary | ICD-10-CM

## 2025-05-30 RX ORDER — UMECLIDINIUM BROMIDE AND VILANTEROL TRIFENATATE 62.5; 25 UG/1; UG/1
1 POWDER RESPIRATORY (INHALATION) DAILY
Qty: 3 EACH | Refills: 3 | Status: SHIPPED | OUTPATIENT
Start: 2025-05-30

## 2025-05-30 RX ORDER — TIOTROPIUM BROMIDE INHALATION SPRAY 3.12 UG/1
5 SPRAY, METERED RESPIRATORY (INHALATION) DAILY
Qty: 3 EACH | Refills: 3 | Status: SHIPPED | OUTPATIENT
Start: 2025-05-30

## 2025-05-30 RX ORDER — FLUTICASONE FUROATE, UMECLIDINIUM BROMIDE AND VILANTEROL TRIFENATATE 200; 62.5; 25 UG/1; UG/1; UG/1
1 POWDER RESPIRATORY (INHALATION) DAILY
Qty: 60 EACH | Refills: 6 | Status: SHIPPED | OUTPATIENT
Start: 2025-05-30 | End: 2025-05-30

## 2025-05-30 NOTE — TELEPHONE ENCOUNTER
Prior Authorization for fluticasone-umeclidinium-vilanterol (TRELEGY ELLIPTA) 200-62.5-25 mcg/PUFF inhaler  has been denied for a quantity of 60 , day supply 30    Prior authorization was denied per the following: Criteria Not Met  **PREFERRED LIST: ANORO ELLIPTA & INCRUSE ELLIPTA    **[place] Denial letter scanned into media    Prior Authorization denial reference number: [id number]  Insurance: [employer]    Please advise.    Thank you,   Calderon Wells CPhT  Pharmacy Liaison

## 2025-05-30 NOTE — TELEPHONE ENCOUNTER
Prior Authorization for fluticasone-umeclidinium-vilanterol (TRELEGY ELLIPTA) 200-62.5-25 mcg/PUFF inhaler  (Quantity: 60, Days: 30) has been submitted via Cover My Meds: Key ([id number])    Insurance: [employer]    Will follow up in 24-48 business hours.     Thank you,   Calderon Wells, [facility]  Pharmacy Liaison

## 2025-05-30 NOTE — TELEPHONE ENCOUNTER
Received New Start request via [employer]  for fluticasone-umeclidinium-vilanterol (TRELEGY ELLIPTA) 200-62.5-25 mcg/PUFF inhaler . (Quantity:60, Day Supply:30)     Insurance: [employer] Medicaid  Member ID:  [id number]  BIN: [id number]  PCN: [id number]   [id number]     Ran Test claim via [place] & medication Rejects stating prior authorization is required.    Thank you,   Calderon Wells, [facility]  Pharmacy Liaison

## 2025-06-01 ENCOUNTER — PATIENT MESSAGE (OUTPATIENT)
Dept: SLEEP MEDICINE | Facility: MEDICAL CENTER | Age: 50
End: 2025-06-01
Payer: MEDICAID

## 2025-06-14 ENCOUNTER — PATIENT MESSAGE (OUTPATIENT)
Dept: SLEEP MEDICINE | Facility: MEDICAL CENTER | Age: 50
End: 2025-06-14
Payer: MEDICAID

## 2025-06-15 ENCOUNTER — PATIENT MESSAGE (OUTPATIENT)
Dept: SLEEP MEDICINE | Facility: MEDICAL CENTER | Age: 50
End: 2025-06-15
Payer: MEDICAID

## 2025-06-25 DIAGNOSIS — J44.9: Primary | ICD-10-CM

## 2025-06-25 PROCEDURE — RXMED WILLOW AMBULATORY MEDICATION CHARGE: Performed by: INTERNAL MEDICINE

## 2025-06-25 NOTE — PROGRESS NOTES
Patient has failed Anoro and has failed LAMA (Spiriva). Ordering Breztri for guideline directed therapy per GOLD 2025 guidelines treating GOLD category E COPD.

## 2025-06-26 ENCOUNTER — PHARMACY VISIT (OUTPATIENT)
Dept: PHARMACY | Facility: MEDICAL CENTER | Age: 50
End: 2025-06-26
Payer: COMMERCIAL

## 2025-07-24 PROCEDURE — RXMED WILLOW AMBULATORY MEDICATION CHARGE: Performed by: INTERNAL MEDICINE

## 2025-07-30 ENCOUNTER — PHARMACY VISIT (OUTPATIENT)
Dept: PHARMACY | Facility: MEDICAL CENTER | Age: 50
End: 2025-07-30
Payer: COMMERCIAL

## 2025-08-25 DIAGNOSIS — J45.40 MODERATE PERSISTENT ASTHMA WITHOUT COMPLICATION: ICD-10-CM

## 2025-08-25 PROCEDURE — RXMED WILLOW AMBULATORY MEDICATION CHARGE: Performed by: INTERNAL MEDICINE

## 2025-08-25 RX ORDER — ALBUTEROL SULFATE 0.83 MG/ML
2.5 SOLUTION RESPIRATORY (INHALATION) EVERY 4 HOURS PRN
Qty: 180 ML | Refills: 2 | Status: SHIPPED | OUTPATIENT
Start: 2025-08-25

## 2025-08-28 ENCOUNTER — PHARMACY VISIT (OUTPATIENT)
Dept: PHARMACY | Facility: MEDICAL CENTER | Age: 50
End: 2025-08-28
Payer: COMMERCIAL